# Patient Record
Sex: FEMALE | Race: WHITE
[De-identification: names, ages, dates, MRNs, and addresses within clinical notes are randomized per-mention and may not be internally consistent; named-entity substitution may affect disease eponyms.]

---

## 2018-01-27 ENCOUNTER — HOSPITAL ENCOUNTER (INPATIENT)
Dept: HOSPITAL 65 - ER | Age: 38
LOS: 3 days | Discharge: HOME | DRG: 335 | End: 2018-01-30
Attending: INTERNAL MEDICINE | Admitting: INTERNAL MEDICINE
Payer: COMMERCIAL

## 2018-01-27 VITALS — DIASTOLIC BLOOD PRESSURE: 84 MMHG | SYSTOLIC BLOOD PRESSURE: 154 MMHG

## 2018-01-27 VITALS — WEIGHT: 217.56 LBS | BODY MASS INDEX: 40.03 KG/M2 | HEIGHT: 62 IN

## 2018-01-27 VITALS — DIASTOLIC BLOOD PRESSURE: 72 MMHG | SYSTOLIC BLOOD PRESSURE: 121 MMHG

## 2018-01-27 VITALS — DIASTOLIC BLOOD PRESSURE: 82 MMHG | SYSTOLIC BLOOD PRESSURE: 112 MMHG

## 2018-01-27 VITALS — SYSTOLIC BLOOD PRESSURE: 135 MMHG | DIASTOLIC BLOOD PRESSURE: 87 MMHG

## 2018-01-27 DIAGNOSIS — Z90.710: ICD-10-CM

## 2018-01-27 DIAGNOSIS — E86.0: ICD-10-CM

## 2018-01-27 DIAGNOSIS — K66.0: ICD-10-CM

## 2018-01-27 DIAGNOSIS — Z53.31: ICD-10-CM

## 2018-01-27 DIAGNOSIS — Z88.0: ICD-10-CM

## 2018-01-27 DIAGNOSIS — Z85.42: ICD-10-CM

## 2018-01-27 DIAGNOSIS — J45.909: ICD-10-CM

## 2018-01-27 DIAGNOSIS — K43.2: Primary | ICD-10-CM

## 2018-01-27 DIAGNOSIS — Z90.49: ICD-10-CM

## 2018-01-27 DIAGNOSIS — D72.829: ICD-10-CM

## 2018-01-27 DIAGNOSIS — E87.6: ICD-10-CM

## 2018-01-27 DIAGNOSIS — N39.0: ICD-10-CM

## 2018-01-27 DIAGNOSIS — K56.2: ICD-10-CM

## 2018-01-27 LAB
ALP INTEST CFR SERPL: 79 U/L (ref 50–136)
ALT SERPL-CCNC: 12 U/L (ref 12–78)
APPEARANCE UR: CLEAR
AST SERPL-CCNC: 7 U/L (ref 0–35)
BASOPHILS # BLD AUTO: 0.1 10^3/UL (ref 0–0.1)
BASOPHILS NFR BLD AUTO: 0.4 % (ref 0–0.2)
BILIRUB UR STRIP.AUTO-MCNC: NEGATIVE MG/DL
CALCIUM SERPL-MCNC: 9.1 MG/DL (ref 8.4–10.5)
CO2 BLDA-SCNC: 27 MMOL/L (ref 20–32)
COLOR UR: YELLOW
EOSINOPHIL # BLD AUTO: 0.1 10^3/UL (ref 0–0.2)
EOSINOPHIL NFR BLD AUTO: 1 % (ref 0–5)
ERYTHROCYTE [DISTWIDTH] IN BLOOD BY AUTOMATED COUNT: 14 % (ref 11.5–14.5)
GLUCOSE PRE 100 G GLC PO SERPL-MCNC: 132 MG/DL (ref 70–110)
HGB BLD-MCNC: 14.6 G/DL (ref 12–15)
LYMPHOCYTES # BLD AUTO: 2.1 10^3/UL (ref 1–4.8)
LYMPHOCYTES NFR BLD AUTO: 16.5 % (ref 24–44)
MCH RBC QN AUTO: 26.2 PG (ref 26–34)
MCHC RBC AUTO-ENTMCNC: 31.5 G/DL (ref 33–37)
MCV RBC AUTO: 83 FL (ref 78–100)
MONOCYTES # BLD AUTO: 0.8 10^3/UL (ref 0.3–0.8)
MONOCYTES NFR BLD AUTO: 5.9 % (ref 5–12)
NEUTROPHILS # BLD AUTO: 9.7 10^3/UL (ref 1.8–7.7)
NEUTROPHILS NFR BLD AUTO: 76 % (ref 41–85)
PLATELET # BLD AUTO: 348 10^3/UL (ref 150–400)
PMV BLD AUTO: 9.8 FL (ref 7.8–11)
UROBILINOGEN UR QL STRIP.AUTO: NORMAL
WBC # BLD AUTO: 12.8 10^3/UL (ref 4.5–11)
WBC,URINE: (no result) WBC/HPF (ref 0–2)

## 2018-01-27 PROCEDURE — 94640 AIRWAY INHALATION TREATMENT: CPT

## 2018-01-27 PROCEDURE — 96374 THER/PROPH/DIAG INJ IV PUSH: CPT

## 2018-01-27 PROCEDURE — 85730 THROMBOPLASTIN TIME PARTIAL: CPT

## 2018-01-27 PROCEDURE — 80048 BASIC METABOLIC PNL TOTAL CA: CPT

## 2018-01-27 PROCEDURE — A9270 NON-COVERED ITEM OR SERVICE: HCPCS

## 2018-01-27 PROCEDURE — 88302 TISSUE EXAM BY PATHOLOGIST: CPT

## 2018-01-27 PROCEDURE — 81000 URINALYSIS NONAUTO W/SCOPE: CPT

## 2018-01-27 PROCEDURE — 85610 PROTHROMBIN TIME: CPT

## 2018-01-27 PROCEDURE — 83690 ASSAY OF LIPASE: CPT

## 2018-01-27 PROCEDURE — 85025 COMPLETE CBC W/AUTO DIFF WBC: CPT

## 2018-01-27 PROCEDURE — 36415 COLL VENOUS BLD VENIPUNCTURE: CPT

## 2018-01-27 PROCEDURE — 74177 CT ABD & PELVIS W/CONTRAST: CPT

## 2018-01-27 PROCEDURE — 80053 COMPREHEN METABOLIC PANEL: CPT

## 2018-01-27 PROCEDURE — 96375 TX/PRO/DX INJ NEW DRUG ADDON: CPT

## 2018-01-27 PROCEDURE — 64488 TAP BLOCK BI INJECTION: CPT

## 2018-01-27 PROCEDURE — 99285 EMERGENCY DEPT VISIT HI MDM: CPT

## 2018-01-27 RX ADMIN — MONTELUKAST SODIUM SCH MG: 10 TABLET, FILM COATED ORAL at 22:24

## 2018-01-27 RX ADMIN — CEFOXITIN SCH MLS/HR: 1 INJECTION, POWDER, FOR SOLUTION INTRAVENOUS at 22:35

## 2018-01-27 RX ADMIN — ENOXAPARIN SODIUM SCH MG: 40 INJECTION SUBCUTANEOUS at 22:34

## 2018-01-27 RX ADMIN — FAMOTIDINE SCH MG: 10 INJECTION INTRAVENOUS at 22:25

## 2018-01-27 RX ADMIN — POTASSIUM CHLORIDE SCH MEQ: 750 TABLET, FILM COATED, EXTENDED RELEASE ORAL at 22:42

## 2018-01-27 RX ADMIN — MORPHINE SULFATE PRN MG: 4 INJECTION, SOLUTION INTRAMUSCULAR; INTRAVENOUS at 23:20

## 2018-01-27 RX ADMIN — DEXTROSE AND SODIUM CHLORIDE SCH MLS/HR: 5; 450 INJECTION, SOLUTION INTRAVENOUS at 22:42

## 2018-01-27 NOTE — NUR
DR ROSA ELENA VASQUEZA SPEAKING WITH WITH DR CUBA PATIENT TO BE ADMITTED PENDING HOSPITALIST 
ACCEPTANCE

## 2018-01-27 NOTE — ER.PDOC
General


Chief Complaint:  Abdomen Pain


Stated Complaint:  ABD PAIN


Time seen by MD:  18:40


Source:  patient, family


Exam Limitations:  no limitations





History of Present Illness


Initial Comments


States generalized ab pains for 1 month.  Intermittent but worsening for past 2 

weeks.  Current pain onset 30 min PTA.  Also hx sliding LLQ ventral hernia she 

usually self-reduces, but due to current pain, has not tried.


No N/V, No diarrhea or const.





Has had TOMY/BSO due to DUB and FH of uterine CA.  Has had lap judith.  Still has 

appendix.


Timing/Duration:  1-3 hours


Severity/Quality:  moderate


Radiation:  no radiation


Associated Symptoms:  denies symptoms


Exacerbated by:  nothing


Relieved By:  nothing


Allergies:  


Coded Allergies:  


     Penicillins (Verified  Allergy, Unknown, HIVES, 1/27/18)


Home Meds


No Active Prescriptions or Reported Meds


Vital Signs





First Vital Signs








  Date Time  Temp Pulse Resp B/P (MAP) Pulse Ox O2 Delivery O2 Flow Rate FiO2


 


1/27/18 17:29 97.8 71 20  96   


 


1/27/18 17:31    154/84 (107)    








Last Vital Signs








  Date Time  Temp Pulse Resp B/P (MAP) Pulse Ox O2 Delivery O2 Flow Rate FiO2


 


1/27/18 17:31 97.8 80 20 154/84 (107) 96   











Past Medical History


Medical History:  no pertinent history


Surgical History:  cholecystectomy, hysterectomy





Social History


Smoking:  non-smoker


Drug Use:  none





Constitutional:  no symptoms reported


EENTM:  no symptoms reported


Respiratory:  no symptoms reported


Cardiovascular:  no symptoms reported


Gastrointestinal:  see HPI


Genitourinary:  no symptoms reported


Musculoskeletal:  no symptoms reported


Skin:  no symptoms reported


Psychiatric/Neurological:  no symptoms reported


All Other Systems:  Reviewed and Negative





Physical Exam


General Appearance:  WD/WN, Moderate Distress


HEENT:  PERRL/EOMI, Normal ENT Inspection


Neck:  Non-Tender, Full Range of Motion, Supple


Respiratory:  chest non-tender, lungs clear, normal breath sounds


Cardiovascular:  Normal Peripheral Pulses, Regular Rate, Rhythm, No Edema


Gastrointestinal:  Normal Bowel Sounds, Soft, Guarding, Hernia, Other (Hernia 

LLQ.  Diffuse left sided tenderwith guarding.)


Back:  Normal Inspection, No CVA Tenderness


Extremities:  Normal Range of Motion, Non-Tender, Normal Inspection, No Pedal 

Edema


Neurologic/Psychiatric:  CNs II-XII NML as Tested, No Motor/Sensory Deficits, 

Alert, Normal Mood/Affect


Skin:  Normal Color, Warm/Dry


Lymphatic:  No Adenopathy





Progress


Progress


Care of patient transferred to Dr. Lawrence at 19:00. Unable to successfully 

reduce the hernia and very painful.





EKG/XRAY/CT/US


CT Comments:  Large infraumbilical hernia without stangulation or obstruction





Course


Blood Pressure Systolic:  154


Blood Pressure Diastolic:  84


Blood Pressure Mean:  107





Departure


Time of Disposition:  21:00


Disposition:  01 HOME, SELF-CARE


Impression:  


 Primary Impression:  


 Incarcerated ventral hernia


Condition:  Stable


Referrals:  


KEEGAN ARMANDO MD (PCP)


PRIMARY CARE PROVIDER


Scripts


No Active Prescriptions or Reported Meds


Comments


Admitted to Dr. Stephens. Spoke to Dr. Hurley who will consult.


Duration or Time Spent with Pa:  2 hours











LIZABETH PATRICK DO Jan 27, 2018 18:54


KELLEE NORTON MD Jan 27, 2018 20:46

## 2018-01-27 NOTE — PRM.ACF1
Date and Time


Date and Time


Time:  21:04





Admission Criteria Forms


                                                                         


                                     ABDOMINAL PAIN





Clinical Indications for Admission to Inpatient Care


 


                                                                          (

[place]/check or initial the applicable condition/criteria):


   


Admission is indicated for ANY ONE of the following (1)(2)(3)(4)(5)(6):





[ x]I.     Surgery needed that cannot be performed on ambulatory basis


[ ]II.    Peritoneal signs present (eg, rebound tenderness, rigidity)      


[ ]III.   Evaluation requires patient to not eat or drink for extended period (

eg, more than 24 hours).


[ ]IV.  Inpatient admission required[B] rather than observation care (see 

Abdominal Pain: Observation Care


         guideline as appropriate) because of ANY ONE of the following(7)(8)(9):


       [ ] a) Hemodynamic instability


       [ ]b) Severe pain requiring acute inpatient management


       [ ]c) Identification of etiology or finding that requires inpatient care 

(eg, aortic dissection, free air,bowel ischemia)(10)


       [ ]d) Absent bowel sounds with complete ileus (11)


       [ ]e) Signs of intestinal obstruction[C]


       [ ]f) Suspected toxic megacolon


       [ ]g) Severe electrolyte abnormalities requiring inpatient care


       [ ]h) High fever or infection requiring inpatient admission as indicated 

by ANY ONE of the following (12)(13):


            [ ]i)   Appropriate outpatient or observation care antimicrobial 

treatment unavailable, not effective, or not feasible


            [ ]ii)   Documented bacteremia


            [ ]iii)   Temperature greater than 104.9 degrees F (40.5 degrees C) 

(oral)


            [ ]iv)   Temperature greater than 103.1 degrees F (39.5 degrees C) (

oral) or less than 96.8 degrees F (36 degrees C) (rectal) that does not respond 

to all emergency                             treatment measures


       [ ]i) IV fluid required rather than oral rehydration to replace 

significant ongoing (eg, for greater than 24 hours) losses (greater than 3 L/m2 

per day)(14)(15)


       [ ]j) Percutaneous or open drainage (eg, abscess, biliary tract) 

procedures


       [ ]k) Parenteral nutrition regimen that must be implemented on inpatient 

basis


       [ ]l) Other condition, treatment, or monitoring requiring inpatient 

admission





Extended stay beyond goal length of stay may be needed for (1)(3)(4)(10)(16):





       [ ]a) Surgery (e.g., colectomy, revascularization procedure)   


       [ ]b) Persistent abdominal pain with suspected intra-abdominal process


       [ ]c) Diagnosed condition requiring continued stay (e.g., pancreatitis, 

complicated diverticulitis)





The original [facility] [employer] content created by [facility] has been revised. 


The portions of the content which have been revised are identified through the 

use of italic text, and [facility] 


has neither reviewed nor approved the modified material.All other unmodified 

content is copyright  [facility].





Please see references footnoted in the original [facility] edition 

2015











KELLEE NORTON MD Jan 27, 2018 21:04

## 2018-01-27 NOTE — NUR
ARRIVAL 

PT ARRIVED TO FLOOR VIA W/C. NO S/S OF DISTRESS NOTED. WILL CONT TO MONITOR. CALL LIGHT 
WITHIN REACH.

## 2018-01-27 NOTE — DIREP
PROCEDURE:CT ABDOMEN/PELVIS W/ CONTRAST

 

COMPARISON:None.

 

INDICATIONS:Ab Pain LLQ

 

TECHNIQUE:Axial images were created through the abdomen and pelvis with 

non-ionic intravenous contrast material. 

No oral contrast was administered.

Sagittal and coronal reconstructions were performed from source images.

 

FINDINGS:

LUNG BASES:Normal.  No visible pulmonary or pleural disease.   

LIVER:Normal.  No significant liver lesions are identified.  

BILIARY:The gallbladder is surgically absent.  There is no biliary ductal 

dilatation.  

PANCREAS:Normal.  No lesion, fluid collection, ductal dilatation, or atrophy.  

 

SPLEEN:Normal.  No enlargement or focal lesion. 

ADRENALS:Normal.  No mass or enlargement.  

URINARY TRACT:Normal.  No focal lesions or hydronephrosis.

AORTA/VASCULAR:Normal.  No aneurysm. 

RETROPERITONEUM:Normal.  No mass or adenopathy.  

BOWEL/MESENTERY:Small bowel is normal caliber.  Mild fecal retention in the 

colon.  No evidence of wall thickening or fluid seen within the hernia 

containing a loop of the transverse colon.  

ABDOMINAL WALL:Large infraumbilical hiatus hernia containing a loop of the 

transverse colon that measures 16.2 x 9.5 x 12 cm.

PELVIC ORGANS:The uterus is surgically absent.  No visible mass.  

BONES:Bilateral pars defect of L5 with grade 1 anterolisthesis measuring 5.9 

mm.

OTHER:Negative.  

 

CONCLUSION:

Large infraumbilical ventral hernia containing a large loop of the transverse 

colon.  No evidence for obstruction or strangulation.  

 

 

 

Dictated by: Jaskaran Harper MD on 01/27/2018 at 08:08 PM     

Electronically Signed By: Jaskaran Harper MD on 01/27/2018 at 08:15 PM

## 2018-01-28 VITALS — DIASTOLIC BLOOD PRESSURE: 72 MMHG | SYSTOLIC BLOOD PRESSURE: 139 MMHG

## 2018-01-28 VITALS — DIASTOLIC BLOOD PRESSURE: 71 MMHG | SYSTOLIC BLOOD PRESSURE: 118 MMHG

## 2018-01-28 VITALS — DIASTOLIC BLOOD PRESSURE: 51 MMHG | SYSTOLIC BLOOD PRESSURE: 107 MMHG

## 2018-01-28 VITALS — DIASTOLIC BLOOD PRESSURE: 75 MMHG | SYSTOLIC BLOOD PRESSURE: 142 MMHG

## 2018-01-28 VITALS — DIASTOLIC BLOOD PRESSURE: 50 MMHG | SYSTOLIC BLOOD PRESSURE: 109 MMHG

## 2018-01-28 VITALS — DIASTOLIC BLOOD PRESSURE: 74 MMHG | SYSTOLIC BLOOD PRESSURE: 130 MMHG

## 2018-01-28 VITALS — SYSTOLIC BLOOD PRESSURE: 96 MMHG | DIASTOLIC BLOOD PRESSURE: 66 MMHG

## 2018-01-28 VITALS — SYSTOLIC BLOOD PRESSURE: 150 MMHG | DIASTOLIC BLOOD PRESSURE: 83 MMHG

## 2018-01-28 PROCEDURE — 0DNW4ZZ RELEASE PERITONEUM, PERCUTANEOUS ENDOSCOPIC APPROACH: ICD-10-PCS | Performed by: SURGERY

## 2018-01-28 PROCEDURE — 0WQF0ZZ REPAIR ABDOMINAL WALL, OPEN APPROACH: ICD-10-PCS | Performed by: SURGERY

## 2018-01-28 RX ADMIN — CEFOXITIN SCH MLS/HR: 1 INJECTION, POWDER, FOR SOLUTION INTRAVENOUS at 00:00

## 2018-01-28 RX ADMIN — DEXTROSE AND SODIUM CHLORIDE SCH MLS/HR: 5; 450 INJECTION, SOLUTION INTRAVENOUS at 05:45

## 2018-01-28 RX ADMIN — HYDROMORPHONE HYDROCHLORIDE PRN MG: 2 INJECTION, SOLUTION INTRAMUSCULAR; INTRAVENOUS; SUBCUTANEOUS at 12:19

## 2018-01-28 RX ADMIN — FAMOTIDINE SCH MG: 10 INJECTION INTRAVENOUS at 08:28

## 2018-01-28 RX ADMIN — CEFOXITIN SCH MLS/HR: 1 INJECTION, POWDER, FOR SOLUTION INTRAVENOUS at 17:23

## 2018-01-28 RX ADMIN — ENOXAPARIN SODIUM SCH MG: 40 INJECTION SUBCUTANEOUS at 21:18

## 2018-01-28 RX ADMIN — Medication PRN EA: at 20:19

## 2018-01-28 RX ADMIN — POTASSIUM CHLORIDE SCH MEQ: 750 TABLET, FILM COATED, EXTENDED RELEASE ORAL at 01:51

## 2018-01-28 RX ADMIN — HYDROMORPHONE HYDROCHLORIDE PRN MG: 2 INJECTION, SOLUTION INTRAMUSCULAR; INTRAVENOUS; SUBCUTANEOUS at 12:40

## 2018-01-28 RX ADMIN — BUDESONIDE SCH MG: 0.5 SUSPENSION RESPIRATORY (INHALATION) at 20:17

## 2018-01-28 RX ADMIN — MONTELUKAST SODIUM SCH MG: 10 TABLET, FILM COATED ORAL at 20:19

## 2018-01-28 RX ADMIN — CEFOXITIN SCH MLS/HR: 1 INJECTION, POWDER, FOR SOLUTION INTRAVENOUS at 12:00

## 2018-01-28 RX ADMIN — SODIUM CHLORIDE, SODIUM LACTATE, POTASSIUM CHLORIDE, CALCIUM CHLORIDE SCH MLS/HR: 600; 310; 30; 20 INJECTION, SOLUTION INTRAVENOUS at 22:47

## 2018-01-28 RX ADMIN — Medication PRN EA: at 15:11

## 2018-01-28 RX ADMIN — DEXTROSE AND SODIUM CHLORIDE SCH MLS/HR: 5; 450 INJECTION, SOLUTION INTRAVENOUS at 12:56

## 2018-01-28 RX ADMIN — IPRATROPIUM BROMIDE AND ALBUTEROL SULFATE SCH ML: .5; 2.5 SOLUTION RESPIRATORY (INHALATION) at 08:31

## 2018-01-28 RX ADMIN — FAMOTIDINE SCH MG: 10 INJECTION INTRAVENOUS at 20:18

## 2018-01-28 RX ADMIN — BUDESONIDE SCH MG: 0.5 SUSPENSION RESPIRATORY (INHALATION) at 08:28

## 2018-01-28 RX ADMIN — DEXTROSE AND SODIUM CHLORIDE SCH MLS/HR: 5; 450 INJECTION, SOLUTION INTRAVENOUS at 07:30

## 2018-01-28 RX ADMIN — HYDROMORPHONE HYDROCHLORIDE PRN MG: 2 INJECTION, SOLUTION INTRAMUSCULAR; INTRAVENOUS; SUBCUTANEOUS at 12:31

## 2018-01-28 RX ADMIN — IPRATROPIUM BROMIDE AND ALBUTEROL SULFATE SCH ML: .5; 2.5 SOLUTION RESPIRATORY (INHALATION) at 20:17

## 2018-01-28 RX ADMIN — SODIUM CHLORIDE, SODIUM LACTATE, POTASSIUM CHLORIDE, CALCIUM CHLORIDE SCH MLS/HR: 600; 310; 30; 20 INJECTION, SOLUTION INTRAVENOUS at 12:59

## 2018-01-28 RX ADMIN — CEFOXITIN SCH MLS/HR: 1 INJECTION, POWDER, FOR SOLUTION INTRAVENOUS at 05:45

## 2018-01-28 RX ADMIN — MORPHINE SULFATE PRN MG: 4 INJECTION, SOLUTION INTRAMUSCULAR; INTRAVENOUS at 05:23

## 2018-01-28 RX ADMIN — IPRATROPIUM BROMIDE AND ALBUTEROL SULFATE SCH ML: .5; 2.5 SOLUTION RESPIRATORY (INHALATION) at 15:52

## 2018-01-28 NOTE — OPH
DATE OF SURGERY:  



PREOPERATIVE DIAGNOSIS:  Recurrent incarcerated incisional hernia in the

infraumbilical midline.



POSTOPERATIVE DIAGNOSES:

1.  Recurrent incisional hernia, incarcerated transverse colon.

2.  Partial volvulus of the transverse colon.

3.  Extensive intraabdominal adhesions.



SURGEON:  Migue Hurley DO



ASSISTANT:  OR staff.



ANESTHESIA:  General by Hannah Carbajal CRNA plus local used on the field as well

as a TAP block provided by Anesthesia at the end of the case.



PROCEDURES PERFORMED:

1.  Laparoscopic lysis of adhesions converted to exploratory laparotomy with

release of partial volvulus of transverse colon.

2.  Open lysis of adhesions.

3.  Open incisional hernia repair.



SPECIMENS:  Hernia sac to path.



ESTIMATED BLOOD LOSS:  43 mL.  



COUNTS:  At the completion of the case, the counts were correct per OR staff.



DESCRIPTION OF PROCEDURE:  The patient is a 37-year-old female known from

previous evaluation.  Prior to procedure, informed consent was obtained.  At

time of procedure, she was taken to the operative suite and placed in supine

position.  After time-out was completed, general anesthesia was obtained.  Gustafson

catheter was inserted by the nursing service.  Her abdomen was prepped and

draped in normal fashion.  Local was used to anesthetize supraumbilical midline.

 Incision was created and 5 mm trocar was introduced into the abdomen with Endo

camera visualization.  Once in the abdomen, pneumoperitoneum was induced to the

level of 14 mmHg.  Next, with camera visualization, the trocar was placed

laterally in the right lower quadrant.  Next, with 2 point visualization, there

was noted to be extensive adhesions in the abdomen.  There was noted to be some

adhesions in the left upper quadrant from the abdominal wall to the liver, which

were taken down using sharp scissors.  Next, in the infraumbilical midline,

there was noted to be significant adhesions consistent with the CT scan

preoperatively visualized.  All the easy to identify adhesions were taken down

using laparoscopic EndoShears and electrocautery.  However, due to concern for

visceral organs being involved and inadequate exposure to allow complete

visualization, conversion to open was indicated.



After reduction of pneumoperitoneum, an infraumbilical incision was created

through an existing scar.  Electrocautery was used to completely dissect down to

the level of the sac, which was entered.  It was extended superiorly and

inferiorly.  It was noted that the transverse colon was involved as a point of

fixed adhesion to the left side of the hernia sac causing a partial volvulus of

the transverse colon.  This was released and subsequently the remainder of the

transverse colon was reduced; however, there was noted to be adhesions of the

omentum to the sac that were taken down using sharp dissection and

electrocautery.  Once the entire contents were mobilized and reduced,  from the 

field.  The omentum was inspected.  There was noted to be a defect in the

omentum that was repaired using a running 2-0 Vicryl suture.  After omentopexy

was completed, the abdominal contents were returned.  Fascia was cleared

superficially to allow exposure and the hernia sac was removed from the

surrounding tissues using electrocautery.  So, there was not significant tissue

tension and the mesh that was described as being used previously on the patient

was not identified.  Subsequently, the defect was repaired from the superior

apex with running looped PDS to the midportion and a second partially looped PDS

in the inferior portion and the mid portion.  After the defect was completely

repaired, the wound bed was irrigated.  The umbilical stalk was reapproximated

with the fascia using a 2-0 Vicryl suture.



With the existing laparoscopy trocars, pneumoperitoneum was reinduced.  There

was noted to be no signs of air leak from the fluid that was placed in the

incision site.  The visceral contents were inspected.  There was noted to be

good hemostasis and no signs of visceral organ injury.  Next, a piece of

Seprafilm was brought onto the field, made into a slurry and placed into the

lower abdomen at the site of previous adhesions.  After this was completed, the

lateral trocar was removed with camera visualization.  There was noted to be no

bleeding.  The superior trocar was removed with camera visualization after

reduction of pneumoperitoneum with camera visualization through the trocar

tract.



In the infraumbilical incision, a drain was passed out through a lateral stab

incision, secured with a nylon suture.  This deep tissue was closed with

interrupted 2-0 Vicryl, subcutaneous tissue was closed with interrupted 3-0

Vicryl and skin was closed with 4-0 Monocryl in running subcuticular fashion. 

The patient was cleaned.  Steri-Strips and dressings were applied.  Drapes

removed.



The patient currently remains in the OR under the care of the Department of

Anesthesia for a TAP block.  After that is completed, then we will awaken her

and deliver her to the recovery room.





______________________________

Migue Hurley DO



DR:  Caitie  JOB# [id number]  [id number]

DD:  01/28/2018 11:39  DT:  01/28/2018 17:06



CC:  . Arden Stephens MD

[email or web address]

## 2018-01-28 NOTE — HPH
ADMIT DATE:  01/28/2018



CHIEF COMPLAINT:  Abdominal pain.



HISTORY OF PRESENT ILLNESS:  The patient is a 37-year-old woman with a past

medical history significant for asthma with large ventral hernia.  She

complained of abdominal pain that has been intermittent for about a month, but

worsening over the last 2 weeks.  About 30 minutes prior to arrival to the ER,

the pain became severe in nature.  She has a known ventral hernia and usually is

able to reduce the hernia herself.  She denied any nausea, vomiting or diarrhea.

 She has no recent trauma.  She has had prior surgeries including

cholecystectomy and total hysterectomy.  She is ambulatory. She is not on home

oxygen.  She does take nebulizer treatments occasionally.



PAST MEDICAL HISTORY:  Includes asthma.



PAST SURGICAL HISTORY:  She had had a cholecystectomy, hysterectomy.



ALLERGIES:  NO KNOWN DRUG ALLERGIES.



HOME MEDICATIONS:  List only includes Singulair, Advair, also takes nebulizer

treatments with albuterol as needed.



SOCIAL HISTORY:  No alcohol, tobacco or illicit drug use history.  Lives at

home.



FAMILY HISTORY:  Negative for early coronary artery disease or diabetes.



REVIEW OF SYSTEMS:

CARDIAC:  Denies chest pain, shortness of breath or dyspnea on exertion.

PULMONARY:  No cough, sputum production or pleuritic chest pain.

GASTROINTESTINAL:  No nausea, vomiting, diarrhea or constipation.

All else negative in 10 point review of system except as in HPI.



PHYSICAL EXAMINATION:

VITAL SIGNS:  Upon arrival to the ER, height 157.5 cm, weight 99.8 kilograms,

temperature 97.8, pulse 80, respiratory rate is 20, blood pressure 154/84, O2

saturation 96% on room air.

GENERAL:  She is alert, in no acute distress at time of exam, pleasant lady.

HEENT:  Pupils equal, round, reactive to light.  Sclerae are anicteric. 

Oropharynx is clear.  Mucous membranes are moist.

NECK:  Supple, no lymphadenopathy.

CARDIOVASCULAR:  At time of exam was regular rate and rhythm.

LUNGS:  Clear bilaterally at time of exam.  No wheezing.

ABDOMEN:  Soft.  Bowel sounds are present.  Tender to palpation and had a large

ventral hernia noted.

EXTREMITIES:  No cyanosis, clubbing or significant edema.

NEUROLOGIC:  Grossly nonfocal.



LABORATORY DATA:  CBC:  White count 12.8, hemoglobin 14.6 and platelets 348. 

Differential:  76% neutrophils, 16% lymphocytes, 6% monocytes.  Sodium 141,

potassium 3.4, chloride 105, CO2 is 27, BUN 13, creatinine 1, glucose 132,

calcium 9.1, total bilirubin 0.6, AST 7, ALT 12, alkaline phosphatase 79, total

protein 7.5, albumin 3.5, lipase is 159.  PT of 10.4, PTT 24.1.  UA, pH is 5.0,

specific gravity is 1.020, all else is negative.



IMAGING STUDIES:  CT abdomen and pelvis performed in the Emergency Room does

reveal a large infraumbilical ventral hernia containing large loop of transverse

colon.



ASSESSMENT AND PLAN:  The patient is a 37-year-old woman here with a ventral

hernia containing large loop of bowel without strangulation at this point with

significant abdominal pain with history of asthma.

1.  From pulmonary point of view, we will continue her Singulair, DuoNeb

scheduled, Pulmicort scheduled and albuterol as needed.

2.  Appropriate p.r.n. pain and nausea medication.

3.  General surgery is consulted for management of ventral hernia.

4.  DVT prophylaxis will be with Lovenox.



Time spent on 01/28/2018 is 45 minutes.  This plan was discussed with the

patient.  She is her own decision maker.  She does understand and concur with

plans.





______________________________

Loyd Stephens MD



DR:  JUAN F/anika  JOB# [id number]  [id number]

DD:  01/28/2018 07:47  DT:  01/28/2018 09:57

## 2018-01-28 NOTE — CNH
DATE OF CONSULTATION:  



CHIEF COMPLAINT:  Incarcerated hernia.



HISTORY OF PRESENT ILLNESS:  This is a 37-year-old female who presents to ER at

[facility].  She reports to me that she had a midline hernia

repaired 4 years ago at the same time she was having hysterectomy and bladder

repair.  This was done in another facility and they used mesh, but she is

uncertain if it was an overlay or inlay and what type of mesh was used.  She

reports that approximately 6 months ago, she noticed a recurrence of her hernia

and that she has typically had no problems, but over the last 2 weeks, she has

been unable to reduce it.  It has become more and more uncomfortable.  After she

ate supper tonight, the pain became fairly significant.  She presented to the ER

at [facility] where she was evaluated, had a CAT scan and lab

studies that show changes consistent with incarcerated, but not strangulated

hernia.  She was initially scheduled to be admitted to the floor.  However, I

saw her in the Emergency Department and again on the floor.  She reports that

her pain was improved with the IV morphine.  She denies any nausea, vomiting,

fever, chills or other constitutional symptoms.  She is very cooperative with my

history and physical.



PAST MEDICAL HISTORY:  She only reports asthma.  She clearly denies

hypothyroidism, diabetes or hypertension.



PAST SURGICAL HISTORY:  Includes laparoscopic cholecystectomy, ,

hysterectomy, bladder suspension and hernia repair.



ALLERGIES:  INCLUDE PENICILLIN per her report.  



OUTPATIENT MEDICATIONS:  Include albuterol p.r.n., Singulair 10 mg p.o. at

bedtime and Advair.



INPATIENT MEDICATIONS:  Will be per the electronic medical record.



SOCIAL HISTORY:  Negative for tobacco or illicit drug use, is positive for rare

alcohol consumption.



FAMILY HISTORY:  Father is living at age 70 and is relatively unhealthy.  Mother

is living in her 60s and has a history of breast cancer on more than one

occasion.



REVIEW OF SYSTEMS:

CONSTITUTIONAL:  No fever, chills or weakness.

ENDOCRINE:  She denies thyroid disease or known diabetes.

CARDIOVASCULAR:  She denies chest pain, trouble breathing at this time.

PULMONARY:  She has no acute dyspnea or cough, but she reports her last asthma

attack was in the last 2 weeks.  She has a strong history for asthma.

SEASONAL ALLERGIES:   She does report some seasonal allergies and associated

variation with her asthma.

ABDOMEN:  As per HPI.

MUSCULOSKELETAL:  No acute complaints.

NEUROLOGIC:  She denies any history of seizure or blackouts.

PSYCHIATRIC:  She denies depression, stress or anxiety.

GENITOURINARY:  She denies dysuria, frequency, urgency.



PHYSICAL EXAMINATION:

VITAL SIGNS:  Afebrile female, last temperature is 97.8, pulse 61, respiratory

rate of 16, blood pressure 121/72 and O2 sat is 95%.

GENERAL:  Alert and pleasant 37-year-old female who is oriented x 3.  She is

cooperative with exam and very pleasant.

HEENT:  Normocephalic, atraumatic.  Pink mucous membranes.

NECK:  Supple and soft.  Trachea is midline.  She has no JVD or thyromegaly.

HEART:  Has essentially regular rate and rhythm without obvious murmur.

LUNGS:  Clear to auscultation posteriorly, but there is a slight expiratory

wheeze noted on the left side on complete examination.

ABDOMEN:  The bowel sounds are positive and soft.  Infraumbilically, she has a

clear ventral hernia that is likely associated with an old incision.  I made

attempts to gently reduce it and is partially reducible, but not completely

reducible.

EXTREMITIES:  Show positive radial pulse bilaterally.  Positive dorsal pedal

pulse bilaterally.

NEUROLOGIC:  No acute findings.  Cranial nerves 2-12 are grossly intact.

SKIN AND INTEGUMENT:  Warm and dry.



LABORATORY STUDIES:  White count 12.8, hemoglobin 14.6 and platelet count 348. 

Chemistry shows BUN of 13, creatinine of 1.02, potassium 3.4.  Coag studies show

PT of 10.4, PTT of 24.1.  Urine shows specific gravity 1.020.  She has leukocyte

esterase positive with occasional WBC in the urine.



IMAGING STUDIES:  She has a CAT scan that shows an incarcerated midline hernia

without obvious strangulation.



SURGICAL ASSESSMENT:

1.  Incarcerated incisional hernia in the lower abdomen.

2.  History of asthma.

3.  Hypokalemia.

4.  Clinical dehydration.

5.  Pyuria.



PLAN:

1.  The patient is seen and examined.  Chart is reviewed.

2.  She will be given GI and DVT prophylaxis.  We will restart some of her home

asthma meds and wait for evaluation by Medicine.

3.  I have discussed this case clearly with the patient and her family as well

as the hospitalist.  We will plan for an elective hernia repair tomorrow;

however, if she becomes worse, we may be forced to have emergency hernia repair.

 The patient has been advised regarding surgical options, the possibility of

laparoscopy being present, but low and that she might require further synthetic

mesh.  The patient and her  expressed understanding.

4.  Due to the pyuria and elevated white count, we will order empiric IV ABX.





______________________________

Migue Hurley DO



DR:  GERALDO/anika  JOB# [id number]  [id number]

DD:  2018 22:23  DT:  2018 20:20



CC:  KEEGAN Stephens MD

MTDD

## 2018-01-29 VITALS — SYSTOLIC BLOOD PRESSURE: 115 MMHG | DIASTOLIC BLOOD PRESSURE: 70 MMHG

## 2018-01-29 VITALS — SYSTOLIC BLOOD PRESSURE: 118 MMHG | DIASTOLIC BLOOD PRESSURE: 64 MMHG

## 2018-01-29 VITALS — DIASTOLIC BLOOD PRESSURE: 57 MMHG | SYSTOLIC BLOOD PRESSURE: 105 MMHG

## 2018-01-29 VITALS — SYSTOLIC BLOOD PRESSURE: 97 MMHG | DIASTOLIC BLOOD PRESSURE: 56 MMHG

## 2018-01-29 VITALS — SYSTOLIC BLOOD PRESSURE: 122 MMHG | DIASTOLIC BLOOD PRESSURE: 62 MMHG

## 2018-01-29 VITALS — SYSTOLIC BLOOD PRESSURE: 111 MMHG | DIASTOLIC BLOOD PRESSURE: 67 MMHG

## 2018-01-29 LAB
BASOPHILS # BLD AUTO: 0 10^3/UL (ref 0–0.1)
BASOPHILS NFR BLD AUTO: 0 % (ref 0–0.2)
CALCIUM SERPL-MCNC: 9.5 MG/DL (ref 8.4–10.5)
CO2 BLDA-SCNC: 25.5 MMOL/L (ref 20–32)
EOSINOPHIL # BLD AUTO: 0 10^3/UL (ref 0–0.2)
EOSINOPHIL NFR BLD AUTO: 0 % (ref 0–5)
ERYTHROCYTE [DISTWIDTH] IN BLOOD BY AUTOMATED COUNT: 14.4 % (ref 11.5–14.5)
GLUCOSE PRE 100 G GLC PO SERPL-MCNC: 173 MG/DL (ref 70–110)
HGB BLD-MCNC: 13.5 G/DL (ref 12–15)
LYMPHOCYTES # BLD AUTO: 0.8 10^3/UL (ref 1–4.8)
LYMPHOCYTES NFR BLD AUTO: 5.7 % (ref 24–44)
LYMPHOCYTES NFR BLD: 6 % (ref 25–36)
MCH RBC QN AUTO: 26.4 PG (ref 26–34)
MCHC RBC AUTO-ENTMCNC: 31.4 G/DL (ref 33–37)
MCV RBC AUTO: 84.1 FL (ref 78–100)
MONOCYTES # BLD AUTO: 0.6 10^3/UL (ref 0.3–0.8)
MONOCYTES NFR BLD AUTO: 3.9 % (ref 5–12)
MONOCYTES NFR BLD: 2 % (ref 3–9)
NEUTROPHILS # BLD AUTO: 12.7 10^3/UL (ref 1.8–7.7)
NEUTROPHILS NFR BLD AUTO: 90.2 % (ref 41–85)
NEUTS BAND NFR BLD: 1 % (ref 2–6)
NEUTS SEG NFR BLD: 91 % (ref 31–76)
PLATELET # BLD AUTO: 383 10^3/UL (ref 150–400)
PMV BLD AUTO: 10.1 FL (ref 7.8–11)
WBC # BLD AUTO: 14.1 10^3/UL (ref 4.5–11)

## 2018-01-29 RX ADMIN — FAMOTIDINE SCH MG: 10 INJECTION INTRAVENOUS at 21:07

## 2018-01-29 RX ADMIN — SODIUM CHLORIDE, SODIUM LACTATE, POTASSIUM CHLORIDE, CALCIUM CHLORIDE SCH MLS/HR: 600; 310; 30; 20 INJECTION, SOLUTION INTRAVENOUS at 08:30

## 2018-01-29 RX ADMIN — FAMOTIDINE SCH MG: 10 INJECTION INTRAVENOUS at 08:48

## 2018-01-29 RX ADMIN — BUDESONIDE SCH MG: 0.5 SUSPENSION RESPIRATORY (INHALATION) at 08:27

## 2018-01-29 RX ADMIN — Medication PRN EA: at 19:01

## 2018-01-29 RX ADMIN — ENOXAPARIN SODIUM SCH MG: 40 INJECTION SUBCUTANEOUS at 22:37

## 2018-01-29 RX ADMIN — IPRATROPIUM BROMIDE AND ALBUTEROL SULFATE SCH ML: .5; 2.5 SOLUTION RESPIRATORY (INHALATION) at 03:31

## 2018-01-29 RX ADMIN — Medication PRN EA: at 02:03

## 2018-01-29 RX ADMIN — MONTELUKAST SODIUM SCH MG: 10 TABLET, FILM COATED ORAL at 21:07

## 2018-01-29 RX ADMIN — SODIUM CHLORIDE, SODIUM LACTATE, POTASSIUM CHLORIDE, CALCIUM CHLORIDE SCH MLS/HR: 600; 310; 30; 20 INJECTION, SOLUTION INTRAVENOUS at 18:30

## 2018-01-29 RX ADMIN — IPRATROPIUM BROMIDE AND ALBUTEROL SULFATE SCH ML: .5; 2.5 SOLUTION RESPIRATORY (INHALATION) at 14:17

## 2018-01-29 RX ADMIN — CEFOXITIN SCH MLS/HR: 1 INJECTION, POWDER, FOR SOLUTION INTRAVENOUS at 00:20

## 2018-01-29 RX ADMIN — IPRATROPIUM BROMIDE AND ALBUTEROL SULFATE SCH ML: .5; 2.5 SOLUTION RESPIRATORY (INHALATION) at 20:47

## 2018-01-29 RX ADMIN — Medication PRN EA: at 11:32

## 2018-01-29 RX ADMIN — CEFOXITIN SCH MLS/HR: 1 INJECTION, POWDER, FOR SOLUTION INTRAVENOUS at 19:01

## 2018-01-29 RX ADMIN — BUDESONIDE SCH MG: 0.5 SUSPENSION RESPIRATORY (INHALATION) at 20:47

## 2018-01-29 RX ADMIN — CEFOXITIN SCH MLS/HR: 1 INJECTION, POWDER, FOR SOLUTION INTRAVENOUS at 11:25

## 2018-01-29 RX ADMIN — CEFOXITIN SCH MLS/HR: 1 INJECTION, POWDER, FOR SOLUTION INTRAVENOUS at 06:08

## 2018-01-29 NOTE — NUR
Post op pain rounds POD #1 after TAP block for surgery. pt is lying in bed. States pain 
has never gone above 6/10. Says pain before surgery was much worst and appreciates the block 
very much. Has been taking pain medications. Has not ambulated as of yet. No complications 
noted.

## 2018-01-29 NOTE — NUR
Patient resting in bed with eyes closed. Resp even and non labored. No s/s of distress noted 
at this time. Will continue to monitor. Call light within reach.

## 2018-01-29 NOTE — NUR
DISCHARGE PLAN



LIVES AT HOME WITH  AND 2 CHILDREN AGES 15 YEARS OLD AND 10 YEARS OLD.  INDEPENDENT.  
DENIES NEED FOR DME OR OXYGEN AT THIS TIME.  GOAL FOR PT IS TO DISCHARGE HOME WITH  
AND CHILDREN.  WILL CONTINUE TO FOLLOW DISCHARGE NEEDS.

## 2018-01-29 NOTE — NUR
Ambulate

pt ambulating in halls, no complaints of pain or discomfort, changing abdominal dressing 
when pt returns to room

## 2018-01-29 NOTE — PNH
DATE:  01/29/2018



SUBJECTIVE:  A 37-year-old female in no acute distress.  She is tolerating some

diet and activity today.



OBJECTIVE:

VITAL SIGNS:  Last temperature is 97.8, pulse 73, respiratory rate 18, blood

pressure 105/57, and O2 sat 92%.

ABDOMEN:  Bowel sounds positive, soft.  She has no significant tenderness.



LABORATORY DATA:  Today show white count is 14.1, hemoglobin 13.5, platelet

count 382,000.  Chemistry, she has normal potassium, BUN of 8, creatinine 0.95. 

Her drain output is apparently decreasing.



ASSESSMENT:  Postoperative day #1, laparoscopic lysis of adhesions and open

hernia repair.



PLAN:

1.  The patient is seen and examined.  Chart is reviewed.

2.  Increase diet and activity as tolerated.

3.  If she improves appropriately, then we will plan to discontinue her drain

before she goes home.





______________________________

Migue Hurley DO



DR:  GERALDO/anika  JOB# [id number]  [id number]

DD:  01/29/2018 19:06  DT:  01/29/2018 23:14



CC:  KEEGAN Stephens MD

## 2018-01-29 NOTE — NUR
Report

Received report from DONALD Young RN

-------------------------------------------------------------------------------

Addendum: 01/29/18 at 2233 by Ny Babcock LVN LVN

-------------------------------------------------------------------------------

DONALD Young LVN

## 2018-01-30 VITALS — DIASTOLIC BLOOD PRESSURE: 66 MMHG | SYSTOLIC BLOOD PRESSURE: 104 MMHG

## 2018-01-30 VITALS — DIASTOLIC BLOOD PRESSURE: 61 MMHG | SYSTOLIC BLOOD PRESSURE: 121 MMHG

## 2018-01-30 VITALS — SYSTOLIC BLOOD PRESSURE: 116 MMHG | DIASTOLIC BLOOD PRESSURE: 60 MMHG

## 2018-01-30 VITALS — DIASTOLIC BLOOD PRESSURE: 57 MMHG | SYSTOLIC BLOOD PRESSURE: 103 MMHG

## 2018-01-30 LAB
BASOPHILS # BLD AUTO: 0 10^3/UL (ref 0–0.1)
BASOPHILS NFR BLD AUTO: 0.2 % (ref 0–0.2)
EOSINOPHIL # BLD AUTO: 0 10^3/UL (ref 0–0.2)
EOSINOPHIL NFR BLD AUTO: 0.1 % (ref 0–5)
ERYTHROCYTE [DISTWIDTH] IN BLOOD BY AUTOMATED COUNT: 14.9 % (ref 11.5–14.5)
HGB BLD-MCNC: 12.4 G/DL (ref 12–15)
LYMPHOCYTES # BLD AUTO: 2.3 10^3/UL (ref 1–4.8)
LYMPHOCYTES NFR BLD AUTO: 25.4 % (ref 24–44)
MCH RBC QN AUTO: 26.3 PG (ref 26–34)
MCHC RBC AUTO-ENTMCNC: 30.9 G/DL (ref 33–37)
MCV RBC AUTO: 85.1 FL (ref 78–100)
MONOCYTES # BLD AUTO: 0.8 10^3/UL (ref 0.3–0.8)
MONOCYTES NFR BLD AUTO: 9 % (ref 5–12)
NEUTROPHILS # BLD AUTO: 5.8 10^3/UL (ref 1.8–7.7)
NEUTROPHILS NFR BLD AUTO: 65.1 % (ref 41–85)
PLATELET # BLD AUTO: 337 10^3/UL (ref 150–400)
PMV BLD AUTO: 9.8 FL (ref 7.8–11)
WBC # BLD AUTO: 8.9 10^3/UL (ref 4.5–11)

## 2018-01-30 RX ADMIN — IPRATROPIUM BROMIDE AND ALBUTEROL SULFATE SCH ML: .5; 2.5 SOLUTION RESPIRATORY (INHALATION) at 08:34

## 2018-01-30 RX ADMIN — FAMOTIDINE SCH MG: 10 INJECTION INTRAVENOUS at 08:12

## 2018-01-30 RX ADMIN — SODIUM CHLORIDE, SODIUM LACTATE, POTASSIUM CHLORIDE, CALCIUM CHLORIDE SCH MLS/HR: 600; 310; 30; 20 INJECTION, SOLUTION INTRAVENOUS at 04:30

## 2018-01-30 RX ADMIN — CEFOXITIN SCH MLS/HR: 1 INJECTION, POWDER, FOR SOLUTION INTRAVENOUS at 12:51

## 2018-01-30 RX ADMIN — CEFOXITIN SCH MLS/HR: 1 INJECTION, POWDER, FOR SOLUTION INTRAVENOUS at 06:00

## 2018-01-30 RX ADMIN — CEFOXITIN SCH MLS/HR: 1 INJECTION, POWDER, FOR SOLUTION INTRAVENOUS at 00:48

## 2018-01-30 RX ADMIN — IPRATROPIUM BROMIDE AND ALBUTEROL SULFATE SCH ML: .5; 2.5 SOLUTION RESPIRATORY (INHALATION) at 03:10

## 2018-01-30 RX ADMIN — Medication PRN EA: at 06:04

## 2018-01-30 RX ADMIN — BUDESONIDE SCH MG: 0.5 SUSPENSION RESPIRATORY (INHALATION) at 08:34

## 2018-01-30 NOTE — NUR
DISCHARGE

PT DISCHARGED AT THIS TIME. PT UNDERSTANDS ALL DISCHARGE INSTRUCTIONS AND FOLLOW UP WITH 
. PT UNDERSTANDS RESTRICTIONS. PT DENIES ANY OTHER QUESTIONS OR CONCERNS AT THIS 
TIME. PT LEAVE BY WHEELCHAIR TO PRIVATE VEHICLE ACCOMPANIED BY

## 2018-01-30 NOTE — PNH
DATE:  01/29/2018



SUBJECTIVE:  She denies any significant uncontrolled pain.  She has not passed

any flatus or bowel movement.  She denies any nausea or vomiting.  She is not

ambulating a significant amount.



OBJECTIVE:

VITAL SIGNS:  T-max last 24 hours is 98.8, pulse 70, respiratory rate is 18,

blood pressure is 105/57, and O2 saturation 92% on room air.

GENERAL:  She is alert, in no acute distress at time of exam.

HEENT:  Pupils equal, round, reactive to light.  Sclerae are anicteric. 

Oropharynx is clear.  Mucous membranes are moist.

NECK:  Supple, no lymphadenopathy.

CARDIOVASCULAR:  At time of exam was regular rate and rhythm.

LUNGS:  Clear bilaterally.

ABDOMEN:  Soft.  Bowel sounds are present, nontender to palpation.

EXTREMITIES:  No cyanosis, clubbing or significant edema.

NEUROLOGIC:  Grossly nonfocal.



LABORATORY DATA:  CBC:  White count 14.1, hemoglobin 13.5 and platelets 383. 

Differential:  90% neutrophils, 6% lymphocytes, 4% monocytes.  Sodium 140,

potassium 4.3, chloride 104, CO2 is 25, BUN 8, creatinine 0.95, glucose is 173,

calcium is 9.5.



ASSESSMENT AND PLAN:  The patient is a 37-year-old woman here status post

surgical repair of ventral hernia with history of asthma.

1.  We will continue current IV antibiotics.

2.  Appropriate p.r.n. pain and nausea medication.

3.  Nebulizer treatments scheduled and as needed.

4.  Encourage frequent ambulation.

5.  DVT prophylaxis with Lovenox.



Time spent on 01/29/2018 is 25 minutes.





______________________________

Loyd Stephens MD



DR:  JUAN F/anika  JOB# [id number]  [id number]

DD:  01/29/2018 23:32  DT:  01/30/2018 20:03

## 2018-01-30 NOTE — DSH
DATE OF DISCHARGE:  01/30/2018



ADMITTING DIAGNOSES:  Abdominal pain with ventral hernia and asthma.



DISCHARGE DIAGNOSES:  Ventral hernia, status post ventral hernia repair with

asthma.



HOSPITAL COURSE:  The patient is a 37-year-old female who came in with a large

ventral hernia with abdominal pains for over 2 weeks that was worsening.  DO Xavi was consulted and he did a laparoscopic lysis of adhesions which was

converted to exploratory laparotomy with release of partial volvulus of

transverse colon and open lysis of adhesions and open incisional hernia repair. 

After surgery, her white count did jump up and she was continued with fluids and

pain control.  Today, her white count is normal.  She is tolerating p.o. intake.

 She is moving around good.  Her pain is much improved.  She is stable for

discharge at this point.  HALLIE drain has been pulled.  She is to follow up with

DO Xavi next week.  Advance diet as tolerated and continue home medications as

is.  I have asked her to ambulate frequently at home and follow up with her PCP

in [place] in 3-4 weeks.





______________________________

Pamela Enamorado MD



DR:  KEEGAN/anika  JOB# [id number]  [id number]

DD:  01/30/2018 12:50  DT:  01/30/2018 21:09

## 2018-02-21 ENCOUNTER — HOSPITAL ENCOUNTER (OUTPATIENT)
Dept: HOSPITAL 65 - RAD | Age: 38
Discharge: HOME | End: 2018-02-21
Attending: SURGERY
Payer: COMMERCIAL

## 2018-02-21 DIAGNOSIS — K43.2: Primary | ICD-10-CM

## 2018-02-21 PROCEDURE — 74176 CT ABD & PELVIS W/O CONTRAST: CPT

## 2018-02-21 NOTE — DIREP
PROCEDURE:CT ABDOMEN/PELVIS W/O CONTRAST

 

COMPARISON:[facility], CT, CT ABD/PELVIS W/ CONTRAST, 01/27/2018, 

07:32 PM.

 

INDICATIONS:K43.2 INCISIONAL HERNIA WITHOUT OBSTRUCTION OR GANGRENE

 

TECHNIQUE:Axial images were created through the abdomen and pelvis without 

intravenous contrast material. Oral contrast was administered.

Sagittal and coronal reconstructions were performed from source images.

 

FINDINGS:

LUNG BASES:No suspicious airspace consolidation or pleural effusion.

LIVER:No suspicious focal hepatic lesion.

BILIARY:Previous cholecystectomy.

PANCREAS:No suspicious pancreatic abnormality.

SPLEEN:The spleen is not significantly enlarged.  No focal splenic lesion 

identified.

ADRENALS:The adrenal glands are unremarkable.

URINARY TRACT:No urinary calculi or hydronephrosis.  No suspicious renal 

contour deforming lesion identified.

AORTA/VASCULAR:No aneurysmal dilatation.

RETROPERITONEUM:No suspicious retroperitoneal lymphadenopathy.

BOWEL/MESENTERY:No evidence for small bowel obstruction.  No gross colonic 

abnormality.  Nonvisualization of the appendix without secondary signs to 

suggest acute appendicitis.  No free air.

ABDOMINAL WALL:Postoperative changes of interval midline ventral abdominal 

wall hernia repair.  There is an approximate 8.8 x 6.2 x 6.9 cm (cc, AP, TV) 

fluid collection occupying the region of prior herniation.  This may reflect 

postoperative seroma.  Superimposed infection would be difficult to reliably 

exclude, particularly as there does appear to be mild stranding of the 

surrounding subcutaneous fat.  There is a tiny fat containing right paramedian 

ventral abdominal wall hernia just superior to the fluid collection.

PELVIC ORGANS:Urinary bladder is not significantly distended.  The uterus 

appears absent.  No free fluid within the pelvis.

BONES:Bilateral pars interarticularis defects at L5 with approximate 6 mm 

anterolisthesis.  Associated degenerative disc disease at the lumbosacral 

junction.  No acute abnormality.

 

CONCLUSION:

1.  Interval postoperative changes of ventral abdominal wall hernia repair.  

There is now a fluid collection occupying the region of previously seen hernia. 

 This may reflect postoperative seroma; however, superimposed infection would 

be difficult to reliably exclude as there is mild stranding of the surrounding 

subcutaneous fat.  If clinically warranted, this should be amenable to image 

guided aspiration.

2.  Additional findings as above.  

 

 

 

Dictated by: Jozef Stephens M.D.  On 02/21/2018 at 10:58 AM     

Electronically Signed By: Jozef Stephens M.D. on 02/21/2018 at 11:14 AM